# Patient Record
(demographics unavailable — no encounter records)

---

## 2024-11-12 NOTE — PROCEDURE
[FreeTextEntry3] : Date of Service: 11/12/2024   Account: [id number]   Patient: SUSAN REYES   YOB: 1980   Age: 44 year     Surgeon:  Roxy Martínez M.D.   PREOPERATIVE DIAGNOSIS: Spondylosis of Lumbar Region without Myelopathy or Radiculopathy (M47.816)       1.           POSTOPERATIVE DIAGNOSIS: Spondylosis of Lumbar Region without Myelopathy or Radiculopathy (M47.816)       1.           PROCEDURE:           1) Left sided L2, L3, L4, L5 medial branch radiofrequency ablation under fluoroscopic guidance.    Anesthesia: None     Risks, benefits and alternatives of the procedure were discussed with the patient after which they agreed to proceed.  Patient was brought into fluoroscopy suite and was placed in prone position with hip support. Back was prepped and draped in a sterile fashion.   Under AP visualization, the left sacral ala was identified and marked. Using a 25 gauge  inch needle the skin and subcutaneous structures at this point were localized with 1% Lidocaine using approximately 3 cc's 1% Lidocaine.  After this, a 20 gauge 100mm Shital radiofrequency needle with a 10mm curved tip was inserted and using depth direction depth technique under constant fluoroscopic visualization, the needle was advanced to the sacral ala until os was contacted.    The camera was then redirected under AP view to visualize the L3,L4,L5 vertebra, the camera was left oblique to approximately 30 degrees to reveal good Gene dog anatomical view. The junction of the superior articulate process and transverse process at the L3,L4,L5 levels on the left were then identified and marked. Skin and subcutaneous structures were then anesthetized with approximately 3 cc's of 1% Lidocaine at each of these levels. After which a 20 gauge 100mm [place] radiofrequency needle with a 10mm curved tip then advanced until the junction at the SAP and transverse process was met.  The camera was then directed through the lateral view and under constant fluoroscopic visualization, the needle tips were then advanced and confirmed at the junction of the SAP and transverse process.    The stylette for the most cephalad needle at the L3 level was then removed and a Shital radiofrequency probe was then placed inside the needle. After their pedis' were checked at approximately 300 ohm, 50 hertz sensory stimulation was performed.  Patient experienced concordant pain in the left low back at approximately 0.3 volts. The voltage was then increased to 1 volt. The patient reported increased left low back pain symptoms without any radiation below the left knee.  Stimulation was then changed to 2 hertz and increased slowly by .1 volt increments at approximately 0.5 volts, patient began to experience thumping like reproductive pain in the left low back. The voltage was then increased to approximately 2.5 volts. Patient experienced increasing thumping without any sensation below the left knee. This exact stimulation was then repeated for the L4 and L5 needles as well as sacral ala needle with concordant pain and no radiation below the left knee. The 3 levels were then anesthetized with approximately 0.5 cc's of 1% Lidocaine. After which each area was then ablated at 80 degrees centigrade for 90 seconds each. Patient felt no pain reproduction during the ablation procedure.  After each of these levels were ablated and injected approximately 1 cc of 0.25% Bupivacaine plus 10 mg of kenalog were then injected before the needles were removed.  Pressure was then applied to the low back. Band-aids were applied.  Patient was brought to the recovery, ambulated on their own after the procedure and reported decreased left sided low back pain. Patient was told to apply ice to the low back for 20 minutes on and 20 minutes off for focal symptoms for 24-48 hours. They are to call the office if they have any questions or concerns.   Roxy Martínez M.D.

## 2024-11-26 NOTE — ASSESSMENT
[FreeTextEntry1] : After discussing various treatment options with the patient including but not limited to oral medications, physical therapy, exercise, modalities as well as interventional spinal injections, we have decided with the following plan:      1) Intervention Injection Therapy:  I personally reviewed the MRI/CT scan images and agree with the radiologist's report. The radiological findings were discussed with the patient.  The risks, benefits, contents and alternatives to injection were explained in full to the patient. Risks outlined include but are not limited to infection,sepsis, bleeding, post-dural puncture headache, nerve damage, temporary increase in pain, syncopal episode, failure to resolve symptoms, allergic reaction, symptom recurrence, and elevation of blood sugar in diabetics. Cortisone may cause immunosuppression. Patient understands the risks. All questions were answered. After discussion of options, patient requested an injection. Information regarding the injection was given to the patient. Which medications to stop prior to the injection was explained to the patient as well.    Follow up in 1-2 weeks post injection for re-evaluation.   Continue Home exercises, stretching, activity modification, physical therapy, and conservative care.  Patient is presenting with acute/sub-acute radicular pain with impairment in ADLs and functionality.  The pain has not responded sufficiently to  conservative care including nsaid therapy and/or physical therapy.  There is no bleeding tendency, unstable medical condition, or systemic infection. The purpose of the spinal injections is to facilitate active therapy by providing short term relief through reduction of pain and inflammation.     Injections, by themselves, are not likely to provide long-term relief. Rather, active rehabilitation with modified work achieves long-term relief by increasing active ROM, strength and stability.   LESI L5/S1 vs RFA - will call

## 2024-11-26 NOTE — HISTORY OF PRESENT ILLNESS
[Neck] : neck [Lower back] : lower back [Right Arm] : right arm [2] : 2 [Dull/Aching] : dull/aching [Radiating] : radiating [Intermittent] : intermittent [Household chores] : household chores [Rest] : rest [Meds] : meds [Ice] : ice [Heat] : heat [Injection therapy] : injection therapy [Sitting] : sitting [Bending forward] : bending forward [] : no [FreeTextEntry1] : left hip  [FreeTextEntry6] : spasm  [FreeTextEntry7] : right shoulder to the elbow and down to the hand. Left side of the lower back down to the thigh, buttock, back of the knee [FreeTextEntry8] : using computer, getting up from sitting position, turning head  [FreeTextEntry9] : brace, elevate  the arm. Tens unit  [de-identified] : using computer, getting up from sitting position, turning head  [de-identified] : MRI cervical 9/28/22

## 2024-11-26 NOTE — PHYSICAL EXAM
"Sherie is a 53 year old who is being evaluated via a billable telephone visit.      What phone number would you like to be contacted at? [phone]  How would you like to obtain your AVS? MyChart    Assessment & Plan     Fibromyalgia  Very complex patient I know little out of.  She is come to me because our pain clinic closed down here.  They were working on decreasing slowly her use of Norco.  Probably continue this decrease.  She is on a maximum of 6.5 pills a day of 5 mg Norco.  We will try to get this down to 6 pills a day.  She is also approved for medical cannabis but it makes her dizzy and she has to lay down when she takes it.  We really need to get her back into a pain clinic.    Chronic, continuous use of opioids  See discussion above.  Talk with her about this and need for pain clinic.    Major depressive disorder, recurrent episode, mild (H)  She is seeing mental health for this.  She is on Luvox.    Generalized anxiety disorder  Has taken Klonopin in the past does take hydroxyzine.  Following with mental health.  There is also probability of ADHD and her.               Tobacco Cessation:   reports that she has been smoking cigarettes. She has a 14.50 pack-year smoking history. She has never used smokeless tobacco.      BMI:   Estimated body mass index is 29.33 kg/m  as calculated from the following:    Height as of 5/26/21: 1.664 m (5' 5.5\").    Weight as of 5/26/21: 81.2 kg (179 lb).           No follow-ups on file.    Twin Castro MD  [facility]    Franky Rehman is a 53 year old who presents for the following health issues : Couple issues mainly did deal with her chronic pain and mental health issues.  She has been discharged from our pain clinic because it is no longer existing.  Trying to cover for a while here but she is probably getting need to get into another chronic pain clinic plus the fact that she has cannabis approval.    HPI     Pain History:  When did " you first notice your pain? - More than 6 weeks   Have you seen this provider for your pain in the past?   Yes   Where in your body do you have pain?  Chronic fatigue, Fibromyalgia    Are you seeing anyone else for your pain? Yes - Was seeing pain clinic but provider left.     PHQ-9 SCORE 12/22/2021 1/5/2022 1/19/2022   PHQ-9 Total Score - - -   PHQ-9 Total Score MyChart - 11 (Moderate depression) 13 (Moderate depression)   PHQ-9 Total Score 12 11 13       CELIA-7 SCORE 1/5/2022 1/19/2022 1/20/2022   Total Score 18 (severe anxiety) - -   Total Score 18 18 16       PEG Score 2/2/2022   PEG Total Score 4.67           Chronic Pain Follow Up:    Location of pain: All over.  Analgesia/pain control:    - Recent changes:      - Overall control: Tolerable with discomfort    - Current treatments: Norco and muscle relaxant Skelaxin.  Adherence:     - Do you ever take more pain medicine than prescribed? No    - When did you take your last dose of pain medicine?     Adverse effects:    PDMP Review     None        Last CSA Agreement:   CSA -- Patient Level:    Controlled Substance Agreement - Opioid - Scan on 5/26/2021  4:37 PM  Controlled Substance Agreement - Opioid - Scan on 1/27/2020  2:58 PM: controlled substance agreement  Controlled Substance Agreement - Opioid - Scan on 1/24/2019  1:31 PM: signed 1/11/2019       Last UDS: 5/28/2021            Review of Systems   Constitutional, HEENT, cardiovascular, pulmonary, gi and gu systems are negative, except as otherwise noted.      Objective           Vitals:  No vitals were obtained today due to virtual visit.    Physical Exam   healthy, alert and no distress  PSYCH: Alert and oriented times 3; coherent speech, normal   rate and volume, able to articulate logical thoughts, able   to abstract reason, no tangential thoughts, no hallucinations   or delusions  Her affect is normal and pleasant  RESP: No cough, no audible wheezing, able to talk in full sentences  Remainder of exam  unable to be completed due to telephone visits                Phone call duration: 16 minutes   [] : no mass lumbar area

## 2024-11-26 NOTE — LETTER BODY
[To Whom it May Concern:] : To Whom it May Concern: [FreeTextEntry1] : Sarai is under my care for her cervical and lumbar spine  medical issues.\par  Sarai had a procedure today 06/02/23, she may not return to work until Tuesday 06/06/23, if you\par  have any questions, please call the office.

## 2024-11-26 NOTE — HISTORY OF PRESENT ILLNESS
[Neck] : neck [Lower back] : lower back [Right Arm] : right arm [2] : 2 [Dull/Aching] : dull/aching [Radiating] : radiating [Intermittent] : intermittent [Household chores] : household chores [Rest] : rest [Meds] : meds [Ice] : ice [Heat] : heat [Injection therapy] : injection therapy [Sitting] : sitting [Bending forward] : bending forward [] : no [FreeTextEntry1] : left hip  [FreeTextEntry6] : spasm  [FreeTextEntry7] : right shoulder to the elbow and down to the hand. Left side of the lower back down to the thigh, buttock, back of the knee [FreeTextEntry8] : using computer, getting up from sitting position, turning head  [FreeTextEntry9] : brace, elevate  the arm. Tens unit  [de-identified] : using computer, getting up from sitting position, turning head  [de-identified] : MRI cervical 9/28/22

## 2025-04-17 NOTE — HISTORY OF PRESENT ILLNESS
[Neck] : neck [Lower back] : lower back [Right Arm] : right arm [Dull/Aching] : dull/aching [Radiating] : radiating [Intermittent] : intermittent [Household chores] : household chores [Rest] : rest [Meds] : meds [Ice] : ice [Heat] : heat [Injection therapy] : injection therapy [Sitting] : sitting [Bending forward] : bending forward [5] : 5 [] : no [FreeTextEntry1] : left hip  [FreeTextEntry6] : spasm  [FreeTextEntry7] : right shoulder to the elbow and down to the hand. Left side of the lower back down to the, rt arm thigh, buttock, back of the knee [FreeTextEntry8] : using computer, getting up from sitting position, turning head  [FreeTextEntry9] : brace, elevate  the arm. Tens unit  [de-identified] : using computer, getting up from sitting position, turning head  [de-identified] : MRI cervical 9/28/22

## 2025-05-27 NOTE — PROCEDURE
[FreeTextEntry3] : Date of Service: 05/27/2025   Account: [id number]   Patient: SUSAN REYES   YOB: 1980   Age: 44 year     Surgeon:  Roxy Martínez M.D.   PREOPERATIVE DIAGNOSIS: Spondylosis of Lumbar Region without Myelopathy or Radiculopathy (M47.816)       1.           POSTOPERATIVE DIAGNOSIS: Spondylosis of Lumbar Region without Myelopathy or Radiculopathy (M47.816)       1.           PROCEDURE:           1) Left sided L2, L3, L4, L5 medial branch radiofrequency ablation under fluoroscopic guidance.    Anesthesia: None     Risks, benefits and alternatives of the procedure were discussed with the patient after which they agreed to proceed.  Patient was brought into fluoroscopy suite and was placed in prone position with hip support. Back was prepped and draped in a sterile fashion.   Under AP visualization, the left sacral ala was identified and marked. Using a 25 gauge  inch needle the skin and subcutaneous structures at this point were localized with 1% Lidocaine using approximately 3 cc's 1% Lidocaine.  After this, a 20 gauge 100mm Shital radiofrequency needle with a 10mm curved tip was inserted and using depth direction depth technique under constant fluoroscopic visualization, the needle was advanced to the sacral ala until os was contacted.    The camera was then redirected under AP view to visualize the L3,L4,L5 vertebra, the camera was left oblique to approximately 30 degrees to reveal good Gene dog anatomical view. The junction of the superior articulate process and transverse process at the L3,L4,L5 levels on the left were then identified and marked. Skin and subcutaneous structures were then anesthetized with approximately 3 cc's of 1% Lidocaine at each of these levels. After which a 20 gauge 100mm [place] radiofrequency needle with a 10mm curved tip then advanced until the junction at the SAP and transverse process was met.  The camera was then directed through the lateral view and under constant fluoroscopic visualization, the needle tips were then advanced and confirmed at the junction of the SAP and transverse process.    The stylette for the most cephalad needle at the L3 level was then removed and a Shital radiofrequency probe was then placed inside the needle. After their pedis' were checked at approximately 300 ohm, 50 hertz sensory stimulation was performed.  Patient experienced concordant pain in the left low back at approximately 0.3 volts. The voltage was then increased to 1 volt. The patient reported increased left low back pain symptoms without any radiation below the left knee.  Stimulation was then changed to 2 hertz and increased slowly by .1 volt increments at approximately 0.5 volts, patient began to experience thumping like reproductive pain in the left low back. The voltage was then increased to approximately 2.5 volts. Patient experienced increasing thumping without any sensation below the left knee. This exact stimulation was then repeated for the L4 and L5 needles as well as sacral ala needle with concordant pain and no radiation below the left knee. The 3 levels were then anesthetized with approximately 0.5 cc's of 1% Lidocaine. After which each area was then ablated at 80 degrees centigrade for 90 seconds each. Patient felt no pain reproduction during the ablation procedure.  After each of these levels were ablated and injected approximately 1 cc of 0.25% Bupivacaine plus 10 mg of kenalog were then injected before the needles were removed.  Pressure was then applied to the low back. Band-aids were applied.  Patient was brought to the recovery, ambulated on their own after the procedure and reported decreased left sided low back pain. Patient was told to apply ice to the low back for 20 minutes on and 20 minutes off for focal symptoms for 24-48 hours. They are to call the office if they have any questions or concerns.   Roxy Martínez M.D.

## 2025-06-12 NOTE — HISTORY OF PRESENT ILLNESS
[Neck] : neck [Lower back] : lower back [Right Arm] : right arm [5] : 5 [Dull/Aching] : dull/aching [Radiating] : radiating [Intermittent] : intermittent [Household chores] : household chores [Rest] : rest [Meds] : meds [Ice] : ice [Heat] : heat [Injection therapy] : injection therapy [Sitting] : sitting [Bending forward] : bending forward [] : no [FreeTextEntry1] : left hip  [FreeTextEntry6] : spasm  [FreeTextEntry7] : right shoulder to the elbow and down to the hand. Left side of the lower back down to the, rt arm thigh, buttock, back of the knee [FreeTextEntry8] : using computer, getting up from sitting position, turning head  [FreeTextEntry9] : brace, elevate  the arm. Tens unit  [de-identified] : using computer, getting up from sitting position, turning head  [de-identified] : MRI cervical 9/28/22

## 2025-06-12 NOTE — ASSESSMENT
[FreeTextEntry1] : After discussing various treatment options with the patient including but not limited to oral medications, physical therapy, exercise, modalities as well as interventional spinal injections, we have decided with the following plan:      1) Intervention Injection Therapy:  I personally reviewed the MRI/CT scan images and agree with the radiologist's report. The radiological findings were discussed with the patient.  The risks, benefits, contents and alternatives to injection were explained in full to the patient. Risks outlined include but are not limited to infection,sepsis, bleeding, post-dural puncture headache, nerve damage, temporary increase in pain, syncopal episode, failure to resolve symptoms, allergic reaction, symptom recurrence, and elevation of blood sugar in diabetics. Cortisone may cause immunosuppression. Patient understands the risks. All questions were answered. After discussion of options, patient requested an injection. Information regarding the injection was given to the patient. Which medications to stop prior to the injection was explained to the patient as well.    Follow up in 1-2 weeks post injection for re-evaluation.   Continue Home exercises, stretching, activity modification, physical therapy, and conservative care.  Patient is presenting with acute/sub-acute radicular pain with impairment in ADLs and functionality.  The pain has not responded sufficiently to  conservative care including nsaid therapy and/or physical therapy.  There is no bleeding tendency, unstable medical condition, or systemic infection. The purpose of the spinal injections is to facilitate active therapy by providing short term relief through reduction of pain and inflammation.     Injections, by themselves, are not likely to provide long-term relief. Rather, active rehabilitation with modified work achieves long-term relief by increasing active ROM, strength and stability.   Left lumbar RFA - will call

## 2025-07-29 NOTE — HISTORY OF PRESENT ILLNESS
[Neck] : neck [Lower back] : lower back [Right Arm] : right arm [5] : 5 [Dull/Aching] : dull/aching [Radiating] : radiating [Intermittent] : intermittent [Household chores] : household chores [Rest] : rest [Meds] : meds [Ice] : ice [Heat] : heat [Injection therapy] : injection therapy [Sitting] : sitting [Bending forward] : bending forward [] : yes [FreeTextEntry1] : left hip  [FreeTextEntry6] : spasm  [FreeTextEntry7] : right shoulder to the elbow and down to the hand. Left side of the lower back down to the, rt arm thigh, buttock, back of the knee [FreeTextEntry8] : using computer, getting up from sitting position, turning head  [FreeTextEntry9] : brace, elevate  the arm. Tens unit  [de-identified] : using computer, getting up from sitting position, turning head  [de-identified] : MRI cervical 9/28/22